# Patient Record
Sex: FEMALE | Race: WHITE | NOT HISPANIC OR LATINO | Employment: UNEMPLOYED | URBAN - METROPOLITAN AREA
[De-identification: names, ages, dates, MRNs, and addresses within clinical notes are randomized per-mention and may not be internally consistent; named-entity substitution may affect disease eponyms.]

---

## 2018-01-23 ENCOUNTER — RECORDS - HEALTHEAST (OUTPATIENT)
Dept: LAB | Facility: CLINIC | Age: 33
End: 2018-01-23

## 2018-01-24 LAB
ALLERGIC TO PENICILLIN: NO
GP B STREP DNA SPEC QL NAA+PROBE: NEGATIVE

## 2018-02-12 ENCOUNTER — ANESTHESIA - HEALTHEAST (OUTPATIENT)
Dept: OBGYN | Facility: HOSPITAL | Age: 33
End: 2018-02-12

## 2018-02-12 ENCOUNTER — RECORDS - HEALTHEAST (OUTPATIENT)
Dept: ADMINISTRATIVE | Facility: OTHER | Age: 33
End: 2018-02-12

## 2018-02-14 ENCOUNTER — COMMUNICATION - HEALTHEAST (OUTPATIENT)
Dept: OBGYN | Facility: HOSPITAL | Age: 33
End: 2018-02-14

## 2018-02-15 ENCOUNTER — COMMUNICATION - HEALTHEAST (OUTPATIENT)
Dept: OBGYN | Facility: HOSPITAL | Age: 33
End: 2018-02-15

## 2018-03-27 ENCOUNTER — COMMUNICATION - HEALTHEAST (OUTPATIENT)
Dept: OBGYN | Facility: HOSPITAL | Age: 33
End: 2018-03-27

## 2018-03-30 ENCOUNTER — COMMUNICATION - HEALTHEAST (OUTPATIENT)
Dept: OBGYN | Facility: HOSPITAL | Age: 33
End: 2018-03-30

## 2018-04-27 ENCOUNTER — RECORDS - HEALTHEAST (OUTPATIENT)
Dept: LAB | Facility: CLINIC | Age: 33
End: 2018-04-27

## 2018-04-27 LAB
HIV 1+2 AB+HIV1 P24 AG SERPL QL IA: NEGATIVE
T4 FREE SERPL-MCNC: 1 NG/DL (ref 0.7–1.8)
TSH SERPL DL<=0.005 MIU/L-ACNC: 0.62 UIU/ML (ref 0.3–5)

## 2018-04-28 LAB — T PALLIDUM AB SER QL: NEGATIVE

## 2018-04-30 LAB
C TRACH DNA SPEC QL PROBE+SIG AMP: NEGATIVE
HPV SOURCE: ABNORMAL
HUMAN PAPILLOMA VIRUS 16 DNA: NEGATIVE
HUMAN PAPILLOMA VIRUS 18 DNA: NEGATIVE
HUMAN PAPILLOMA VIRUS FINAL DIAGNOSIS: ABNORMAL
HUMAN PAPILLOMA VIRUS OTHER HR: POSITIVE
N GONORRHOEA DNA SPEC QL NAA+PROBE: NEGATIVE
SPECIMEN DESCRIPTION: ABNORMAL

## 2018-05-03 LAB
BKR LAB AP ABNORMAL BLEEDING: NO
BKR LAB AP BIRTH CONTROL/HORMONES: ABNORMAL
BKR LAB AP CERVICAL APPEARANCE: NORMAL
BKR LAB AP GYN ADEQUACY: ABNORMAL
BKR LAB AP GYN INTERPRETATION: ABNORMAL
BKR LAB AP HPV REFLEX: ABNORMAL
BKR LAB AP LMP: ABNORMAL
BKR LAB AP PATIENT STATUS: ABNORMAL
BKR LAB AP PREVIOUS ABNORMAL: ABNORMAL
BKR LAB AP PREVIOUS NORMAL: ABNORMAL
HIGH RISK?: NO
PATH REPORT.COMMENTS IMP SPEC: ABNORMAL
RESULT FLAG (HE HISTORICAL CONVERSION): ABNORMAL

## 2018-05-17 ENCOUNTER — RECORDS - HEALTHEAST (OUTPATIENT)
Dept: LAB | Facility: CLINIC | Age: 33
End: 2018-05-17

## 2018-05-19 LAB — BACTERIA SPEC CULT: NORMAL

## 2018-05-25 ENCOUNTER — RECORDS - HEALTHEAST (OUTPATIENT)
Dept: ADMINISTRATIVE | Facility: OTHER | Age: 33
End: 2018-05-25

## 2018-05-25 LAB
LAB AP CHARGES (HE HISTORICAL CONVERSION): NORMAL
PATH REPORT.COMMENTS IMP SPEC: NORMAL
PATH REPORT.COMMENTS IMP SPEC: NORMAL
PATH REPORT.FINAL DX SPEC: NORMAL
PATH REPORT.GROSS SPEC: NORMAL
PATH REPORT.MICROSCOPIC SPEC OTHER STN: NORMAL
PATH REPORT.RELEVANT HX SPEC: NORMAL
RESULT FLAG (HE HISTORICAL CONVERSION): NORMAL

## 2018-06-25 ENCOUNTER — RECORDS - HEALTHEAST (OUTPATIENT)
Dept: ADMINISTRATIVE | Facility: OTHER | Age: 33
End: 2018-06-25

## 2018-07-06 ASSESSMENT — MIFFLIN-ST. JEOR: SCORE: 1211.18

## 2018-07-09 ENCOUNTER — ANESTHESIA - HEALTHEAST (OUTPATIENT)
Dept: SURGERY | Facility: HOSPITAL | Age: 33
End: 2018-07-09

## 2018-07-09 ENCOUNTER — SURGERY - HEALTHEAST (OUTPATIENT)
Dept: SURGERY | Facility: HOSPITAL | Age: 33
End: 2018-07-09

## 2018-10-19 ENCOUNTER — RECORDS - HEALTHEAST (OUTPATIENT)
Dept: LAB | Facility: CLINIC | Age: 33
End: 2018-10-19

## 2018-10-19 LAB
ANION GAP SERPL CALCULATED.3IONS-SCNC: 9 MMOL/L (ref 5–18)
BUN SERPL-MCNC: 15 MG/DL (ref 8–22)
CALCIUM SERPL-MCNC: 9.3 MG/DL (ref 8.5–10.5)
CHLORIDE BLD-SCNC: 105 MMOL/L (ref 98–107)
CO2 SERPL-SCNC: 25 MMOL/L (ref 22–31)
CREAT SERPL-MCNC: 0.67 MG/DL (ref 0.6–1.1)
GFR SERPL CREATININE-BSD FRML MDRD: >60 ML/MIN/1.73M2
GLUCOSE BLD-MCNC: 125 MG/DL (ref 70–125)
MAGNESIUM SERPL-MCNC: 2.3 MG/DL (ref 1.8–2.6)
POTASSIUM BLD-SCNC: 4 MMOL/L (ref 3.5–5)
SODIUM SERPL-SCNC: 139 MMOL/L (ref 136–145)
TSH SERPL DL<=0.005 MIU/L-ACNC: 0.97 UIU/ML (ref 0.3–5)

## 2019-09-19 ENCOUNTER — RECORDS - HEALTHEAST (OUTPATIENT)
Dept: LAB | Facility: CLINIC | Age: 34
End: 2019-09-19

## 2019-09-19 LAB
ABORH_EXT (HISTORICAL CONVERSION): NORMAL
ANTIBODY_EXT (HISTORICAL CONVERSION): NEGATIVE
BASOPHILS # BLD AUTO: 0 THOU/UL (ref 0–0.2)
BASOPHILS NFR BLD AUTO: 0 % (ref 0–2)
EOSINOPHIL # BLD AUTO: 0.2 THOU/UL (ref 0–0.4)
EOSINOPHIL NFR BLD AUTO: 2 % (ref 0–6)
ERYTHROCYTE [DISTWIDTH] IN BLOOD BY AUTOMATED COUNT: 12.7 % (ref 11–14.5)
HBSAG_EXT (HISTORICAL CONVERSION): NEGATIVE
HCT VFR BLD AUTO: 40.4 % (ref 35–47)
HGB BLD-MCNC: 13.4 G/DL (ref 12–16)
HGB_EXT (HISTORICAL CONVERSION): 13.4
HIV 1+2 AB+HIV1 P24 AG SERPL QL IA: NEGATIVE
HIV_EXT: NEGATIVE
LYMPHOCYTES # BLD AUTO: 1.8 THOU/UL (ref 0.8–4.4)
LYMPHOCYTES NFR BLD AUTO: 22 % (ref 20–40)
MCH RBC QN AUTO: 31.6 PG (ref 27–34)
MCHC RBC AUTO-ENTMCNC: 33.2 G/DL (ref 32–36)
MCV RBC AUTO: 95 FL (ref 80–100)
MONOCYTES # BLD AUTO: 0.4 THOU/UL (ref 0–0.9)
MONOCYTES NFR BLD AUTO: 5 % (ref 2–10)
NEUTROPHILS # BLD AUTO: 5.6 THOU/UL (ref 2–7.7)
NEUTROPHILS NFR BLD AUTO: 70 % (ref 50–70)
PLATELET # BLD AUTO: 258 THOU/UL (ref 140–440)
PLT_EXT - HISTORICAL: 258
PMV BLD AUTO: 10 FL (ref 8.5–12.5)
RBC # BLD AUTO: 4.24 MILL/UL (ref 3.8–5.4)
RUBELLA_EXT (HISTORICAL CONVERSION): NORMAL
WBC: 8 THOU/UL (ref 4–11)

## 2019-09-20 LAB
ABO/RH(D): NORMAL
ABORH REPEAT: NORMAL
ANTIBODY SCREEN: NEGATIVE
BACTERIA SPEC CULT: NO GROWTH
C TRACH DNA SPEC QL PROBE+SIG AMP: NEGATIVE
HBV SURFACE AG SERPL QL IA: NEGATIVE
N GONORRHOEA DNA SPEC QL NAA+PROBE: NEGATIVE
RUBV IGG SERPL QL IA: POSITIVE
T PALLIDUM AB SER QL: NEGATIVE

## 2019-10-14 LAB
HPV SOURCE: NORMAL
HUMAN PAPILLOMA VIRUS 16 DNA: NEGATIVE
HUMAN PAPILLOMA VIRUS 18 DNA: NEGATIVE
HUMAN PAPILLOMA VIRUS FINAL DIAGNOSIS: NORMAL
HUMAN PAPILLOMA VIRUS OTHER HR: NEGATIVE
SPECIMEN DESCRIPTION: NORMAL

## 2019-10-18 ENCOUNTER — RECORDS - HEALTHEAST (OUTPATIENT)
Dept: ADMINISTRATIVE | Facility: OTHER | Age: 34
End: 2019-10-18

## 2019-11-22 ENCOUNTER — RECORDS - HEALTHEAST (OUTPATIENT)
Dept: LAB | Facility: CLINIC | Age: 34
End: 2019-11-22

## 2019-11-26 LAB
# FETUSES US: NORMAL
AFP MOM SERPL: 1.04
AFP SERPL-MCNC: 50 NG/ML
AGE - REPORTED: 33.7 YR
CURRENT SMOKER: NO
FAMILY MEMBER DISEASES HX: NO
GA METHOD: NORMAL
GA: NORMAL WK
HCG MOM SERPL: 0.44
HCG SERPL-ACNC: NORMAL IU/L
HX OF HEREDITARY DISORDERS: NO
IDDM PATIENT QL: NO
INHIBIN A MOM SERPL: 2.46
INHIBIN A SERPL-MCNC: 413 PG/ML
INTEGRATED SCN PATIENT-IMP: NORMAL
PATHOLOGY STUDY: NORMAL
SPECIMEN DRAWN SERPL: NORMAL
U ESTRIOL MOM SERPL: 0.85
U ESTRIOL SERPL-MCNC: 1.48 NG/ML

## 2019-12-16 ENCOUNTER — AMBULATORY - HEALTHEAST (OUTPATIENT)
Dept: MATERNAL FETAL MEDICINE | Facility: HOSPITAL | Age: 34
End: 2019-12-16

## 2019-12-16 DIAGNOSIS — O26.90 PREGNANCY, ANTEPARTUM, COMPLICATIONS: ICD-10-CM

## 2019-12-23 ENCOUNTER — AMBULATORY - HEALTHEAST (OUTPATIENT)
Dept: MATERNAL FETAL MEDICINE | Facility: HOSPITAL | Age: 34
End: 2019-12-23

## 2019-12-27 ENCOUNTER — RECORDS - HEALTHEAST (OUTPATIENT)
Dept: ULTRASOUND IMAGING | Facility: HOSPITAL | Age: 34
End: 2019-12-27

## 2019-12-27 ENCOUNTER — OFFICE VISIT - HEALTHEAST (OUTPATIENT)
Dept: MATERNAL FETAL MEDICINE | Facility: HOSPITAL | Age: 34
End: 2019-12-27

## 2019-12-27 DIAGNOSIS — O26.90 PREGNANCY RELATED CONDITIONS, UNSPECIFIED, UNSPECIFIED TRIMESTER: ICD-10-CM

## 2019-12-27 DIAGNOSIS — O35.BXX0 ECHOGENIC FOCUS OF HEART OF FETUS AFFECTING ANTEPARTUM CARE OF MOTHER, SINGLE OR UNSPECIFIED FETUS: ICD-10-CM

## 2020-01-20 ENCOUNTER — RECORDS - HEALTHEAST (OUTPATIENT)
Dept: LAB | Facility: CLINIC | Age: 35
End: 2020-01-20

## 2020-01-20 LAB — HGB_EXT (HISTORICAL CONVERSION): 11.7

## 2020-01-21 LAB
RPR - HISTORICAL: NORMAL
T PALLIDUM AB SER QL: NEGATIVE

## 2020-02-18 ENCOUNTER — RECORDS - HEALTHEAST (OUTPATIENT)
Dept: ADMINISTRATIVE | Facility: OTHER | Age: 35
End: 2020-02-18

## 2020-03-19 ENCOUNTER — RECORDS - HEALTHEAST (OUTPATIENT)
Dept: LAB | Facility: CLINIC | Age: 35
End: 2020-03-19

## 2020-03-20 LAB
ALLERGIC TO PENICILLIN: NORMAL
GP B STREP DNA SPEC QL NAA+PROBE: NEGATIVE

## 2020-04-20 ENCOUNTER — HOSPITAL ENCOUNTER (OUTPATIENT)
Dept: OBGYN | Facility: HOSPITAL | Age: 35
Discharge: HOME | End: 2020-04-20
Attending: FAMILY MEDICINE | Admitting: FAMILY MEDICINE

## 2020-04-20 RX ORDER — CETIRIZINE HYDROCHLORIDE 10 MG/1
10 TABLET ORAL DAILY
Status: SHIPPED | COMMUNITY
Start: 2020-04-20

## 2020-04-20 RX ORDER — ACYCLOVIR 400 MG/1
400 TABLET ORAL DAILY
Status: SHIPPED | COMMUNITY
Start: 2020-04-20

## 2020-04-20 ASSESSMENT — MIFFLIN-ST. JEOR: SCORE: 1324.58

## 2020-04-23 LAB — T PALLIDUM AB SER QL: NEGATIVE

## 2021-03-29 ENCOUNTER — RECORDS - HEALTHEAST (OUTPATIENT)
Dept: LAB | Facility: CLINIC | Age: 36
End: 2021-03-29

## 2021-03-29 LAB — HGB BLD-MCNC: 13.4 G/DL (ref 12–16)

## 2021-06-01 VITALS — BODY MASS INDEX: 25.52 KG/M2 | HEIGHT: 60 IN | WEIGHT: 130 LBS

## 2021-06-04 VITALS — WEIGHT: 155 LBS | HEIGHT: 60 IN | BODY MASS INDEX: 30.43 KG/M2

## 2021-06-04 NOTE — PROGRESS NOTES
"Please see \"Imaging\" tab under \"Chart Review\" for details of today's visit.    Ion Quiñones        "

## 2021-06-07 NOTE — PROGRESS NOTES
RNs at bedside during shift change. Pt questioned how long induction would take. RNs informed pt it is possible it could take a few days (pt had been informed prior to starting induction per RN that started induction.) Pt became tearful saying that she cannot be away from her baby (at home) for that long. Pt reported that she wanted her cervix examined and if there was no change she was going home.   This RN called and informed Dr. Adrienne Youngblood. Last Cytotec was given at 1430. Pt werner q3-4 min. FHR cat 1 tracing. VSS.     SVE at 1550. 1/50/-2. Pt reported she wanted to be discharged immediately. RN encouraged pt to stay to monitor baby until 1730 per Dr HALINA Youngblood. Pt agreeable. Dr EDUARDO Youngblood notified.

## 2021-06-07 NOTE — PROGRESS NOTES
RN called into room.  Pt stated that she is feeling contractions about every 10 minutes and is undecided on if she would like to go home or stay to receive further cervical ripening. Latest SVE unchanged.  Dr. Youngblood notified again of pt's indecisiveness. Dr. Youngblood approves discharge after 1730 after continuous monitoring of FHTs.  Discussed the options with patient for cervical ripening overnight vs. going home and waiting for natural labor.  Pt is anxious to be home with her daughter and requests to be discharged as soon as possible.

## 2021-06-07 NOTE — PROGRESS NOTES
Patient here for induction. Unable to start induction at this time due to staffing, patient refuses to reschedule induction to tomorrow. Admission information obtained. Admission fetal monitor strip obtained. Patient tired as she did not sleep well, so plans on taking a nap until a nurse is available to start her induction.

## 2021-06-07 NOTE — PLAN OF CARE
Problem: Safety  Goal: Patient will be injury free during hospitalization  Outcome: Progressing     Problem: Daily Care  Goal: Daily care needs are met  Outcome: Progressing     Problem: Labor & Delivery  Goal: Manages discomfort  Outcome: Progressing  Pt here for elective induction of labor. Cytotec induction started at 1220.

## 2021-06-16 PROBLEM — Z34.90 PREGNANT: Status: ACTIVE | Noted: 2018-02-12

## 2021-06-16 PROBLEM — Z34.90 PREGNANT: Status: ACTIVE | Noted: 2020-04-20

## 2021-06-16 PROBLEM — A60.00 GENITAL HERPES: Chronic | Status: ACTIVE | Noted: 2018-02-12

## 2021-06-16 NOTE — ANESTHESIA POSTPROCEDURE EVALUATION
Patient: Ines Conrad  * No procedures listed *  Anesthesia type: epidural    Patient location:   Last vitals:   Vitals:    02/13/18 0258   BP: 119/57   Pulse: 69   Resp: 16   Temp: 36.4  C (97.5  F)   SpO2:    Pt satisfied with LE, has ambulated.  Post vital signs: stable  Level of consciousness: baseline  Post-anesthesia pain: pain controlled  Post-anesthesia nausea and vomiting: no  Pulmonary: unassisted, return to baseline  Cardiovascular: stable and blood pressure at baseline  Hydration: adequate  Anesthetic events: no    QCDR Measures:  ASA# 11 - Melody-op Cardiac Arrest: ASA11B - Patient did NOT experience unanticipated cardiac arrest  ASA# 12 - Melody-op Mortality Rate: ASA12B - Patient did NOT die  ASA# 13 - PACU Re-Intubation Rate: ASA13B - Patient did NOT require a new airway mgmt  ASA# 10 - Composite Anes Safety: ASA10A - No serious adverse event    Additional Notes:

## 2021-06-16 NOTE — ANESTHESIA PREPROCEDURE EVALUATION
Anesthesia Evaluation      Patient summary reviewed   No history of anesthetic complications     Airway   Mallampati: II  Neck ROM: full   Pulmonary - negative ROS and normal exam                          Cardiovascular - negative ROS and normal exam  Exercise tolerance: > or = 4 METS  Rhythm: regular  Rate: normal,         Neuro/Psych - negative ROS     Endo/Other    (+) pregnant     GI/Hepatic/Renal - negative ROS           Dental - normal exam                        Anesthesia Plan  Planned anesthetic: epidural    ASA 2     Anesthetic plan and risks discussed with: patient    Post-op plan: routine recovery

## 2021-06-16 NOTE — ANESTHESIA PROCEDURE NOTES
Epidural Block    Patient location during procedure: OB  Time Called: 2/12/2018 3:19 AM  Reason for Block:labor epidural  Staffing:  Performing  Anesthesiologist: KHURRAM CRUZ  Preanesthetic Checklist  Completed: patient identified, risks, benefits, and alternatives discussed, timeout performed, consent obtained, at patient's request, airway assessed, oxygen available, suction available, emergency drugs available and hand hygiene performed  Procedure  Patient position: sitting  Prep: ChloraPrep  Patient monitoring: continuous pulse oximetry, heart rate and blood pressure  Approach: midline  Location: L4-L5  Injection technique: CHERI saline  Number of Attempts:1  Needle  Needle type: Reuben   Needle gauge: 18 G     Catheter in Space: 5  Assessment  Sensory level: T10  No complications      Additional Notes:  saskia well

## 2021-06-19 NOTE — ANESTHESIA POSTPROCEDURE EVALUATION
Patient: Ines AGUILERA Conrad  LAPAROSCOPY LEFT OVARIAN CYSTECTOMY,  AND REMOVAL OF MOLE ( RIGHT OF UMBILICUS  Anesthesia type: general    Patient location: PACU  Last vitals:   Vitals:    07/09/18 1545   BP:    Pulse: 76   Resp: 20   Temp: 36.3  C (97.4  F)   SpO2: 99%     Post vital signs: stable  Level of consciousness: awake and responds to simple questions  Post-anesthesia pain: pain controlled  Post-anesthesia nausea and vomiting: no  Pulmonary: unassisted, return to baseline  Cardiovascular: stable and blood pressure at baseline  Hydration: adequate  Anesthetic events: no    QCDR Measures:  ASA# 11 - Melody-op Cardiac Arrest: ASA11B - Patient did NOT experience unanticipated cardiac arrest  ASA# 12 - Melody-op Mortality Rate: ASA12B - Patient did NOT die  ASA# 13 - PACU Re-Intubation Rate: ASA13B - Patient did NOT require a new airway mgmt  ASA# 10 - Composite Anes Safety: ASA10A - No serious adverse event    Additional Notes:

## 2021-06-19 NOTE — ANESTHESIA CARE TRANSFER NOTE
Last vitals:   Vitals:    07/09/18 1521   BP: 131/72   Pulse: (!) 105   Resp: 17   Temp:    SpO2: 100%   Temp: 98.2f temporal    Volatile agents turned off, muscle relaxant reversed, 4/4 twitches with sustained tetany. Pt breathing spontaneously with adequate tidal volumes, following commands, gently suctioned oropharynx, extubated without issue. Transported by CRNA and RN to recovery.      Patient's level of consciousness is awake and drowsy  Spontaneous respirations: yes  Maintains airway independently: yes  Dentition unchanged: yes  Oropharynx: oropharynx clear of all foreign objects    QCDR Measures:  ASA# 20 - Surgical Safety Checklist: WHO surgical safety checklist completed prior to induction  PQRS# 430 - Adult PONV Prevention: 4558F - Pt received => 2 anti-emetic agents (different classes) preop & intraop  ASA# 8 - Peds PONV Prevention: NA - Not pediatric patient, not GA or 2 or more risk factors NOT present  PQRS# 424 - Melody-op Temp Management: 4559F - At least one body temp DOCUMENTED => 35.5C or 95.9F within required timeframe  PQRS# 426 - PACU Transfer Protocol: - Transfer of care checklist used  ASA# 14 - Acute Post-op Pain: ASA14B - Patient did NOT experience pain >= 7 out of 10

## 2022-08-04 NOTE — ANESTHESIA PREPROCEDURE EVALUATION
Anesthesia Evaluation      Patient summary reviewed   No history of anesthetic complications     Airway   Mallampati: II   Pulmonary - normal exam   (+) a smoker (1ppd. smoker's cough)                         Cardiovascular - negative ROS  Exercise tolerance: > or = 4 METS  Rhythm: regular  Rate: normal,         Neuro/Psych - negative ROS     Endo/Other - negative ROS      GI/Hepatic/Renal    (+) GERD well controlled,        Other findings: UPT  Hgb=15.0        Dental - normal exam                        Anesthesia Plan  Planned anesthetic: general endotracheal  Soft bite block  Scopolamine patch  Zofran/decadron  Propofol background infusion  ASA 2   Induction: intravenous   Anesthetic plan and risks discussed with: patient    Post-op plan: routine recovery           no

## 2023-07-21 ENCOUNTER — LAB REQUISITION (OUTPATIENT)
Dept: LAB | Facility: CLINIC | Age: 38
End: 2023-07-21

## 2023-07-21 DIAGNOSIS — R20.0 ANESTHESIA OF SKIN: ICD-10-CM

## 2023-07-21 PROCEDURE — 80053 COMPREHEN METABOLIC PANEL: CPT | Performed by: FAMILY MEDICINE

## 2023-07-21 PROCEDURE — 82607 VITAMIN B-12: CPT | Performed by: FAMILY MEDICINE

## 2023-07-21 PROCEDURE — 84443 ASSAY THYROID STIM HORMONE: CPT | Performed by: FAMILY MEDICINE

## 2023-07-22 LAB
ALBUMIN SERPL BCG-MCNC: 4.4 G/DL (ref 3.5–5.2)
ALP SERPL-CCNC: 69 U/L (ref 35–104)
ALT SERPL W P-5'-P-CCNC: 11 U/L (ref 0–50)
ANION GAP SERPL CALCULATED.3IONS-SCNC: 12 MMOL/L (ref 7–15)
AST SERPL W P-5'-P-CCNC: 18 U/L (ref 0–45)
BILIRUB SERPL-MCNC: <0.2 MG/DL
BUN SERPL-MCNC: 9 MG/DL (ref 6–20)
CALCIUM SERPL-MCNC: 9.2 MG/DL (ref 8.6–10)
CHLORIDE SERPL-SCNC: 103 MMOL/L (ref 98–107)
CREAT SERPL-MCNC: 0.58 MG/DL (ref 0.51–0.95)
DEPRECATED HCO3 PLAS-SCNC: 23 MMOL/L (ref 22–29)
GFR SERPL CREATININE-BSD FRML MDRD: >90 ML/MIN/1.73M2
GLUCOSE SERPL-MCNC: 95 MG/DL (ref 70–99)
POTASSIUM SERPL-SCNC: 3.9 MMOL/L (ref 3.4–5.3)
PROT SERPL-MCNC: 6.2 G/DL (ref 6.4–8.3)
SODIUM SERPL-SCNC: 138 MMOL/L (ref 136–145)
TSH SERPL DL<=0.005 MIU/L-ACNC: 1.34 UIU/ML (ref 0.3–4.2)
VIT B12 SERPL-MCNC: 428 PG/ML (ref 232–1245)

## 2023-07-24 ENCOUNTER — TRANSCRIBE ORDERS (OUTPATIENT)
Dept: OTHER | Age: 38
End: 2023-07-24

## 2023-07-24 DIAGNOSIS — R13.10 DYSPHAGIA: Primary | ICD-10-CM

## 2023-09-16 ENCOUNTER — HEALTH MAINTENANCE LETTER (OUTPATIENT)
Age: 38
End: 2023-09-16

## 2023-09-25 ENCOUNTER — TELEPHONE (OUTPATIENT)
Dept: NEUROLOGY | Facility: CLINIC | Age: 38
End: 2023-09-25
Payer: COMMERCIAL

## 2023-09-26 ENCOUNTER — OFFICE VISIT (OUTPATIENT)
Dept: NEUROLOGY | Facility: CLINIC | Age: 38
End: 2023-09-26
Payer: COMMERCIAL

## 2023-09-26 VITALS
DIASTOLIC BLOOD PRESSURE: 57 MMHG | HEART RATE: 78 BPM | SYSTOLIC BLOOD PRESSURE: 98 MMHG | OXYGEN SATURATION: 98 % | HEIGHT: 60 IN | BODY MASS INDEX: 21.6 KG/M2 | WEIGHT: 110 LBS

## 2023-09-26 DIAGNOSIS — R26.89 IMBALANCE: ICD-10-CM

## 2023-09-26 DIAGNOSIS — M54.2 NECK PAIN: Primary | ICD-10-CM

## 2023-09-26 DIAGNOSIS — R20.2 NUMBNESS AND TINGLING IN BOTH HANDS: ICD-10-CM

## 2023-09-26 DIAGNOSIS — R20.0 NUMBNESS AND TINGLING IN BOTH HANDS: ICD-10-CM

## 2023-09-26 DIAGNOSIS — R35.0 FREQUENT URINATION: ICD-10-CM

## 2023-09-26 DIAGNOSIS — R13.19 OTHER DYSPHAGIA: ICD-10-CM

## 2023-09-26 DIAGNOSIS — H53.8 BLURRED VISION: ICD-10-CM

## 2023-09-26 PROCEDURE — 99204 OFFICE O/P NEW MOD 45 MIN: CPT | Mod: GC

## 2023-09-26 RX ORDER — PRAMIPEXOLE DIHYDROCHLORIDE 0.5 MG/1
TABLET ORAL
COMMUNITY
Start: 2023-05-30

## 2023-09-26 NOTE — PATIENT INSTRUCTIONS
- It is unlikely you have MS based on your symptoms, but to be certain we will order brain and neck MRI images to make sure   - The best way to prevent MS is to supplement with vitamin D - this can be purchased over the counter
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
## 2023-09-26 NOTE — NURSING NOTE
Suresh Conrad is a 38 year old female who presents for:  Chief Complaint   Patient presents with    Dysphagia     Dysphagia        Initial Vitals:  BP 98/57   Pulse 78   Ht 1.524 m (5')   Wt 49.9 kg (110 lb)   SpO2 98%   BMI 21.48 kg/m   Estimated body mass index is 21.48 kg/m  as calculated from the following:    Height as of this encounter: 1.524 m (5').    Weight as of this encounter: 49.9 kg (110 lb).. Body surface area is 1.45 meters squared. BP completed using cuff size: small regular    Rebecca Rathai

## 2023-10-30 ENCOUNTER — HOSPITAL ENCOUNTER (OUTPATIENT)
Dept: MRI IMAGING | Facility: HOSPITAL | Age: 38
Discharge: HOME | End: 2023-10-30
Attending: STUDENT IN AN ORGANIZED HEALTH CARE EDUCATION/TRAINING PROGRAM
Payer: COMMERCIAL

## 2023-10-30 DIAGNOSIS — M54.2 NECK PAIN: ICD-10-CM

## 2023-10-30 DIAGNOSIS — R13.19 OTHER DYSPHAGIA: ICD-10-CM

## 2023-10-30 DIAGNOSIS — R20.0 NUMBNESS AND TINGLING IN BOTH HANDS: ICD-10-CM

## 2023-10-30 DIAGNOSIS — H53.8 BLURRED VISION: ICD-10-CM

## 2023-10-30 DIAGNOSIS — R20.2 NUMBNESS AND TINGLING IN BOTH HANDS: ICD-10-CM

## 2023-10-30 DIAGNOSIS — R26.89 IMBALANCE: ICD-10-CM

## 2023-10-30 PROCEDURE — 70553 MRI BRAIN STEM W/O & W/DYE: CPT

## 2023-10-30 PROCEDURE — 72156 MRI NECK SPINE W/O & W/DYE: CPT

## 2023-10-30 PROCEDURE — A9585 GADOBUTROL INJECTION: HCPCS | Mod: JZ | Performed by: STUDENT IN AN ORGANIZED HEALTH CARE EDUCATION/TRAINING PROGRAM

## 2023-10-30 PROCEDURE — 255N000002 HC RX 255 OP 636: Mod: JZ | Performed by: STUDENT IN AN ORGANIZED HEALTH CARE EDUCATION/TRAINING PROGRAM

## 2023-10-30 RX ORDER — GADOBUTROL 604.72 MG/ML
0.1 INJECTION INTRAVENOUS ONCE
Status: COMPLETED | OUTPATIENT
Start: 2023-10-30 | End: 2023-10-30

## 2023-10-30 RX ADMIN — GADOBUTROL 5 ML: 604.72 INJECTION INTRAVENOUS at 16:49

## 2023-11-27 ENCOUNTER — LAB REQUISITION (OUTPATIENT)
Dept: LAB | Facility: CLINIC | Age: 38
End: 2023-11-27

## 2023-11-27 DIAGNOSIS — M79.641 PAIN IN RIGHT HAND: ICD-10-CM

## 2023-11-27 LAB
ALBUMIN SERPL BCG-MCNC: 4.3 G/DL (ref 3.5–5.2)
ALP SERPL-CCNC: 70 U/L (ref 40–150)
ALT SERPL W P-5'-P-CCNC: 8 U/L (ref 0–50)
ANION GAP SERPL CALCULATED.3IONS-SCNC: 12 MMOL/L (ref 7–15)
AST SERPL W P-5'-P-CCNC: 14 U/L (ref 0–45)
BILIRUB SERPL-MCNC: <0.2 MG/DL
BUN SERPL-MCNC: 15.4 MG/DL (ref 6–20)
CALCIUM SERPL-MCNC: 9.3 MG/DL (ref 8.6–10)
CHLORIDE SERPL-SCNC: 105 MMOL/L (ref 98–107)
CREAT SERPL-MCNC: 0.71 MG/DL (ref 0.51–0.95)
CRP SERPL-MCNC: <3 MG/L
DEPRECATED HCO3 PLAS-SCNC: 21 MMOL/L (ref 22–29)
EGFRCR SERPLBLD CKD-EPI 2021: >90 ML/MIN/1.73M2
ERYTHROCYTE [SEDIMENTATION RATE] IN BLOOD BY WESTERGREN METHOD: 3 MM/HR (ref 0–20)
GLUCOSE SERPL-MCNC: 109 MG/DL (ref 70–99)
POTASSIUM SERPL-SCNC: 4.8 MMOL/L (ref 3.4–5.3)
PROT SERPL-MCNC: 6.5 G/DL (ref 6.4–8.3)
RHEUMATOID FACT SERPL-ACNC: <10 IU/ML
SODIUM SERPL-SCNC: 138 MMOL/L (ref 135–145)

## 2023-11-27 PROCEDURE — 86038 ANTINUCLEAR ANTIBODIES: CPT | Performed by: FAMILY MEDICINE

## 2023-11-27 PROCEDURE — 86431 RHEUMATOID FACTOR QUANT: CPT | Performed by: FAMILY MEDICINE

## 2023-11-27 PROCEDURE — 85652 RBC SED RATE AUTOMATED: CPT | Performed by: FAMILY MEDICINE

## 2023-11-27 PROCEDURE — 80053 COMPREHEN METABOLIC PANEL: CPT | Performed by: FAMILY MEDICINE

## 2023-11-27 PROCEDURE — 86140 C-REACTIVE PROTEIN: CPT | Performed by: FAMILY MEDICINE

## 2023-11-28 LAB — ANA SER QL IF: NEGATIVE

## 2024-05-14 ENCOUNTER — TRANSFERRED RECORDS (OUTPATIENT)
Dept: HEALTH INFORMATION MANAGEMENT | Facility: CLINIC | Age: 39
End: 2024-05-14
Payer: COMMERCIAL

## 2024-05-15 ENCOUNTER — MEDICAL CORRESPONDENCE (OUTPATIENT)
Dept: HEALTH INFORMATION MANAGEMENT | Facility: CLINIC | Age: 39
End: 2024-05-15
Payer: COMMERCIAL

## 2024-05-16 ENCOUNTER — TRANSCRIBE ORDERS (OUTPATIENT)
Dept: OTHER | Age: 39
End: 2024-05-16

## 2024-05-16 DIAGNOSIS — R29.898 HAND WEAKNESS: Primary | ICD-10-CM

## 2024-10-04 ENCOUNTER — TELEPHONE (OUTPATIENT)
Dept: CONSULT | Facility: CLINIC | Age: 39
End: 2024-10-04
Payer: COMMERCIAL

## 2024-10-04 NOTE — TELEPHONE ENCOUNTER
I received a voicemail from Suresh inquiring about genetic testing for the familial CMT. I have seen several of her relatives in the past.    I spoke with Suresh and we arranged for a telephone genetic counseling appointment next Wednesday 10/9 at 2:00pm.     Jolene Ronquillo MS, [facility]  Licensed Genetic Counselor  [facility]  Phone: [phone]  Fax: [phone]

## 2024-10-09 ENCOUNTER — VIRTUAL VISIT (OUTPATIENT)
Dept: CONSULT | Facility: CLINIC | Age: 39
End: 2024-10-09
Attending: GENETIC COUNSELOR, MS
Payer: COMMERCIAL

## 2024-10-09 DIAGNOSIS — Z84.89 FAMILY HISTORY OF GENETIC DISEASE: Primary | ICD-10-CM

## 2024-10-09 DIAGNOSIS — R20.0 NUMBNESS AND TINGLING IN BOTH HANDS: ICD-10-CM

## 2024-10-09 DIAGNOSIS — R20.2 NUMBNESS AND TINGLING IN BOTH HANDS: ICD-10-CM

## 2024-10-09 PROCEDURE — 999N000069 HC STATISTIC GENETIC COUNSELING, < 16 MIN: Mod: TEL,95 | Performed by: GENETIC COUNSELOR, MS

## 2024-10-09 NOTE — PROGRESS NOTES
GENETIC COUNSELING CONSULTATION NOTE    Date of visit: Oct 9, 2024    Presenting Information:   Suresh Conrad is a 39 year old female referred to the [facility] due to a family history of CMT1X. She was seen for a genetic counseling appointment today to discuss genetic testing for the known familial variant. Today's visit was conducted via telephone     Suresh reports that over the past year she has had worsening numbness in her hands and arms and that this increases with colder weather. She reports that previously she thought she may have multiple sclerosis.     Upon chart review, it looks like Suresh was seen by a Neurologist (Dr. VANCE Johnson, resident, and Dr. Cutler attending) at [facility] in September 2023 for evaluation of her history of restless leg syndrome, 5 years of dysphagia, intermittent bilateral upper and lower extremity numbness, weakness, blurred vision, urinary frequency and cognitive changes. They did not feel her symptoms were consistent with multiple sclerosis. In the clinic note from this visit it is documented that there is a history of MS in Suresh's mother and maternal aunt, but in the patient entered questionnaire it is documented that there is a family history of CMT, but this was not commented in the note so I fear this was misattributed as MS by both the patient and neurologist.      Suresh reports no other major health concerns today. Her sister in-law was the one who informed her about the positive family genetic testing for CMT1X, so Suresh would like testing for herself to determine if she has this familial condition.     Family History: A three generation pedigree was obtained at Suresh's family member's genetic counseling visit and was updated and scanned into the electronic medical record. The relevant portions are described below:    Children-   7 year old daughter who is healthy  4 year old son who is healthy  Siblings-   Brother, Mohan, is 46 years  old and has CMT with symptoms beginning when he was a toddler.   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   His 22 year old son is healthy  His 17 year old son is healthy.   Brother, Deborah, is 41 years old and has carpal tunnel.   He has one daughter and three sons who are healthy.   Brother, Paco, is 39 years old and is healthy.   He has two sons who are healthy.   Brother, Mehrdad, is 37 years old and has CMT symptoms.   His 3 year old daughter has poor coordination and balance and had a neuropathies panel which identified the GJB1 c.548G>A (p.[id number]) variant.   He has a 13 year old son who is healthy. He has three other children, but there is limited information about their health since there is no contact with them.   Brother, Ender, is 35 years old and has CMT symptoms.   He has one son who is healthy.   Parents-   Mother is reported to have CMT although her symptoms are milder than her sons'.  Father is alive and well.   Maternal Relatives-   At least 2 maternal aunts who are reported to have CMT. One aunt has a son who is severely affected with CMT.   No information about other maternal aunts/uncles.   Maternal grandmother is    Maternal grandfather is reported to have had CMT symptoms. He is .   Paternal Relatives- no paternal relative information collected today    Family history is otherwise largely non-contributory. Maternal and paternal ancestry is . Consanguinity was denied.     Genetic Counseling Discussion:  For review, our bodies are made of cells that contain our chromosomes which are made up of long stretches of DNA containing our genes. Our genes serve as the instructions for our bodies to grow and function. We have two copies of each gene, one inherited from our mother and one inherited from our father.     Amea family has CMT1X with a GJB1 pathogenic  variant called c.548G>A ([id number]) identified in two of her nieces (her brothers daughters).      GJB1 gene and CMT1X:  The GJB1 gene provides the instructions for building a protein called connexin 32. This protein plays an important role in the peripheral nervous system, although this role is not fully understood. Pathogenic variants (mutations) in the GJB1 gene cause Charcot-Tete-Tooth disease type 1X (CMT1X).      Charcot-Tete-Tooth (CMT) disease encompasses a group of disorders called hereditary sensory and motor neuropathies. CMT causes damage to the peripheral nerves that connect the brain and spinal cord to the muscles and sensory cells. The damage to the peripheral nerves causes muscle weakness and atrophy and loss of sensation in the feet, legs, and hands. Muscle weakness is symmetric and typically begins in the first to third decade of life and slowly progresses. Individuals with CMT may also have foot anomalies such as high arches, flat feet, or curled toes (hammer toes) and in rare cases they may develop hearing or vision loss. In most affected individuals CMT does not affect life expectancy. There are several different types of CMT (CMT1, CMT2, CMT4, CMTX) that were originally classified by their effects on nerve cells and patterns of inheritance and have now been further classified as the genetic cause of CMT subtypes have been uncovered.     CMT1X is the second most common type of CMT, accounting for 10% of all genetically defined CMT. CMT1X is a peripheral neuropathy that causes damage to the myelin in two types of the nerves, the motor nerves (involved in muscle movement) and the sensory nerves (involved in sensation or feeling). Damage to motor nerves causes muscle weakness and difficulty with muscle movement, especially in the hands and feet. This can lead to difficulty walking, writing, putting on jewelry and many other types of movement. Weakness in the small muscles of the foot can also  "lead to changes in the structure of the foot such as hammer toes or high/low arches. Damage to the sensory nerves can cause numbness, tingling and impaired balance, which can lead to fatigue.     The GJB1 gene is located on the X chromosome and therefore this condition is inherited in an X-linked inheritance pattern. Because males have one X chromosome and one Y chromosome, they will be affected if their X chromosome gene has a pathogenic variant. Because females have two X chromosomes, if one of their genes has a variant, they have a \"back-up\" second copy of the gene so they may be more mildly affected or unaffected. Female carriers of X-linked conditions have a 50% chance of passing their pathogenic variant to each child regardless of the child's sex. Males who are affected with X-linked conditions will pass their pathogenic variant to each of their female children and none of their male children.     CMT1X is highly variable within members of the same family. Individuals may not experience any symptoms, these symptoms may be mild, or they might begin as mild and gradually become more severe. Boys and men with CMTX generally develop the symptoms of CMTX between the ages of 5 and 20 and most develop symptoms prior to age 10. However, about 20% of men with this form of CMT have a later onset of symptoms. Unlike other type of CMT, individuals may experience weakness in their intrinsic hand muscles, especially their thenar (thumb) muscles, as their first symptom. Muscle weakness may be especially prominent in their thumbs and distal calf muscles.Additionally, there are many reports of stroke-like episodes that occur in men with CMTX prior to age 20. These episodes may involve inability to speak and/or muscle weakness on one side of the body which generally resolves without lasting damage but may reoccur. Almost all females with CMTX have atypical nerve conduction study results but some may never develop symptoms of " this condition. Many females with CMTX have more mild symptoms that what is seen in males. In some studies, as many as 1/3 of females with CMTX have a more severe presentation and their symptoms progress similarly to males with this condition. Genetic testing cannot predict how mild or severe an individual's symptoms will be.    Genetic Testing:  We discussed the potential benefits of genetic testing for the known familial GJB1 variant for Suresh today and why this genetic testing is medically indicated. A positive result will help determine the etiology of the numbness symptoms noted in Suresh and will guide the medical management for her. We discussed that if her results are positive for the familial variant, we can refer her to our specialist in our comprehensive CMT clinic (Neurologist, Physical Therapy, Genetic Counseling, and other specialists). Also, if Suresh does have the familial CMT1X, it will give us a more accurate risk assessment for her children who would then have a 50% chance of inheriting the condition.     This testing for Suresh will be performed via Next Generation Sequencing (NGS) and will only be analyzing the GJB1 gene. NGS is a well established technology utilized by all molecular genetic labs throughout the country for identifying disease-causing mutations in various genes.  NGS is currently the standard of care for genetic testing of single genes.  The recommended testing for Suresh is DIAGNOSTIC testing, and it is NOT investigational.    Suresh consented to genetic testing today. She will be mailed a buccal kit for sample collection. [facility] will bill Suresh's insurance directly and overall cost may depend on the remaining benefits, deductible, and co-insurances. Suresh will receive a bill from [facility] [facility] and if the bill is >$100 a representative from [facility] [facility] [facility] will reach out and discuss their financial assistance programs and payment plans. I will call Suresh with results of testing about  2-3 weeks after the lab receives her sample.     It was a pleasure talking with Suresh today. She was encouraged to reach out to me if she has any further questions.     Plan:  [facility] GJB1 Familial Variant Testing  Suresh will be mailed a buccal kit for sample collection. I will call her with results about 2-3 weeks after the lab receives her sample.       Jolene Ronquillo MS, [facility]  Licensed Genetic Counselor   [facility]  Phone: [phone]  Fax: [phone]    Time spent in consultation via telephone was approximately 10 minutes.

## 2024-10-09 NOTE — LETTER
10/9/2024      RE: Suresh Conrad  [address]  [place] [address]     Dear Colleague,    Thank you for the opportunity to participate in the care of your patient, Suresh Conrad, at the [facility] at [facility]. Please see a copy of my visit note below.    GENETIC COUNSELING CONSULTATION NOTE    Date of visit: Oct 9, 2024    Presenting Information:   Suresh Conrad is a 39 year old female referred to the [facility] due to a family history of CMT1X. She was seen for a genetic counseling appointment today to discuss genetic testing for the known familial variant. Today's visit was conducted via telephone     Suresh reports that over the past year she has had worsening numbness in her hands and arms and that this increases with colder weather. She reports that previously she thought she may have multiple sclerosis.     Upon chart review, it looks like Suresh was seen by a Neurologist (Dr. VANCE Johnson, resident, and Dr. Cutler attending) at [facility] in September 2023 for evaluation of her history of restless leg syndrome, 5 years of dysphagia, intermittent bilateral upper and lower extremity numbness, weakness, blurred vision, urinary frequency and cognitive changes. They did not feel her symptoms were consistent with multiple sclerosis. In the clinic note from this visit it is documented that there is a history of MS in Suresh's mother and maternal aunt, but in the patient entered questionnaire it is documented that there is a family history of CMT, but this was not commented in the note so I fear this was misattributed as MS by both the patient and neurologist.      Suresh reports no other major health concerns today. Her sister in-law was the one who informed her about the positive family genetic testing for CMT1X, so Suresh would like testing for herself to determine if she has  this familial condition.     Family History: A three generation pedigree was obtained at [facility]'s family member's genetic counseling visit and was updated and scanned into the electronic medical record. The relevant portions are described below:    Children-   7 year old daughter who is healthy  4 year old son who is healthy  Siblings-   Brother, Mohan, is 46 years old and has CMT with symptoms beginning when he was a toddler.   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   His 22 year old son is healthy  His 17 year old son is healthy.   Brother, Deborah, is 41 years old and has carpal tunnel.   He has one daughter and three sons who are healthy.   Brother, Paco, is 39 years old and is healthy.   He has two sons who are healthy.   Brother, Mehrdad, is 37 years old and has CMT symptoms.   His 3 year old daughter has poor coordination and balance and had a neuropathies panel which identified the GJB1 c.548G>A (p.[id number]) variant.   He has a 13 year old son who is healthy. He has three other children, but there is limited information about their health since there is no contact with them.   Brother, Ender, is 35 years old and has CMT symptoms.   He has one son who is healthy.   Parents-   Mother is reported to have CMT although her symptoms are milder than her sons'.  Father is alive and well.   Maternal Relatives-   At least 2 maternal aunts who are reported to have CMT. One aunt has a son who is severely affected with CMT.   No information about other maternal aunts/uncles.   Maternal grandmother is    Maternal grandfather is reported to have had CMT symptoms. He is .   Paternal Relatives- no paternal relative information collected today    Family history is otherwise largely non-contributory. Maternal and paternal ancestry is . Consanguinity was denied.     Genetic Counseling  Discussion:  For review, our bodies are made of cells that contain our chromosomes which are made up of long stretches of DNA containing our genes. Our genes serve as the instructions for our bodies to grow and function. We have two copies of each gene, one inherited from our mother and one inherited from our father.     Suresh family has CMT1X with a GJB1 pathogenic variant called c.548G>A (p.[id number]) identified in two of her nieces (her brothers daughters).      GJB1 gene and CMT1X:  The GJB1 gene provides the instructions for building a protein called connexin 32. This protein plays an important role in the peripheral nervous system, although this role is not fully understood. Pathogenic variants (mutations) in the GJB1 gene cause Charcot-Tete-Tooth disease type 1X (CMT1X).      Charcot-Tete-Tooth (CMT) disease encompasses a group of disorders called hereditary sensory and motor neuropathies. CMT causes damage to the peripheral nerves that connect the brain and spinal cord to the muscles and sensory cells. The damage to the peripheral nerves causes muscle weakness and atrophy and loss of sensation in the feet, legs, and hands. Muscle weakness is symmetric and typically begins in the first to third decade of life and slowly progresses. Individuals with CMT may also have foot anomalies such as high arches, flat feet, or curled toes (hammer toes) and in rare cases they may develop hearing or vision loss. In most affected individuals CMT does not affect life expectancy. There are several different types of CMT (CMT1, CMT2, CMT4, CMTX) that were originally classified by their effects on nerve cells and patterns of inheritance and have now been further classified as the genetic cause of CMT subtypes have been uncovered.     CMT1X is the second most common type of CMT, accounting for 10% of all genetically defined CMT. CMT1X is a peripheral neuropathy that causes damage to the myelin in two types of the nerves, the  "motor nerves (involved in muscle movement) and the sensory nerves (involved in sensation or feeling). Damage to motor nerves causes muscle weakness and difficulty with muscle movement, especially in the hands and feet. This can lead to difficulty walking, writing, putting on jewelry and many other types of movement. Weakness in the small muscles of the foot can also lead to changes in the structure of the foot such as hammer toes or high/low arches. Damage to the sensory nerves can cause numbness, tingling and impaired balance, which can lead to fatigue.     The GJB1 gene is located on the X chromosome and therefore this condition is inherited in an X-linked inheritance pattern. Because males have one X chromosome and one Y chromosome, they will be affected if their X chromosome gene has a pathogenic variant. Because females have two X chromosomes, if one of their genes has a variant, they have a \"back-up\" second copy of the gene so they may be more mildly affected or unaffected. Female carriers of X-linked conditions have a 50% chance of passing their pathogenic variant to each child regardless of the child's sex. Males who are affected with X-linked conditions will pass their pathogenic variant to each of their female children and none of their male children.     CMT1X is highly variable within members of the same family. Individuals may not experience any symptoms, these symptoms may be mild, or they might begin as mild and gradually become more severe. Boys and men with CMTX generally develop the symptoms of CMTX between the ages of 5 and 20 and most develop symptoms prior to age 10. However, about 20% of men with this form of CMT have a later onset of symptoms. Unlike other type of CMT, individuals may experience weakness in their intrinsic hand muscles, especially their thenar (thumb) muscles, as their first symptom. Muscle weakness may be especially prominent in their thumbs and distal calf " muscles.Additionally, there are many reports of stroke-like episodes that occur in men with CMTX prior to age 20. These episodes may involve inability to speak and/or muscle weakness on one side of the body which generally resolves without lasting damage but may reoccur. Almost all females with CMTX have atypical nerve conduction study results but some may never develop symptoms of this condition. Many females with CMTX have more mild symptoms that what is seen in males. In some studies, as many as 1/3 of females with CMTX have a more severe presentation and their symptoms progress similarly to males with this condition. Genetic testing cannot predict how mild or severe an individual's symptoms will be.    Genetic Testing:  We discussed the potential benefits of genetic testing for the known familial GJB1 variant for Amea today and why this genetic testing is medically indicated. A positive result will help determine the etiology of the numbness symptoms noted in Suresh and will guide the medical management for her. We discussed that if her results are positive for the familial variant, we can refer her to our specialist in our comprehensive CMT clinic (Neurologist, Physical Therapy, Genetic Counseling, and other specialists). Also, if Suresh does have the familial CMT1X, it will give us a more accurate risk assessment for her children who would then have a 50% chance of inheriting the condition.     This testing for Suresh will be performed via Next Generation Sequencing (NGS) and will only be analyzing the GJB1 gene. NGS is a well established technology utilized by all molecular genetic labs throughout the country for identifying disease-causing mutations in various genes.  NGS is currently the standard of care for genetic testing of single genes.  The recommended testing for Amea is DIAGNOSTIC testing, and it is NOT investigational.    Suresh consented to genetic testing today. She will be mailed a buccal kit for sample  collection. [facility] will bill Suresh's insurance directly and overall cost may depend on the remaining benefits, deductible, and co-insurances. Suresh will receive a bill from [facility] and if the bill is >$100 a representative from [facility] will reach out and discuss their financial assistance programs and payment plans. I will call Suresh with results of testing about 2-3 weeks after the lab receives her sample.     It was a pleasure talking with Suresh today. She was encouraged to reach out to me if she has any further questions.     Plan:  [facility] GJB1 Familial Variant Testing  Suresh will be mailed a buccal kit for sample collection. I will call her with results about 2-3 weeks after the lab receives her sample.       Jolene Ronquillo MS, [facility]  Licensed Genetic Counselor   [facility]  Phone: [phone]  Fax: [phone]    Time spent in consultation via telephone was approximately 10 minutes.      Please do not hesitate to contact me if you have any questions/concerns.     Sincerely,       Britt Ronquillo, GC

## 2024-10-10 ENCOUNTER — LAB REQUISITION (OUTPATIENT)
Dept: LAB | Facility: CLINIC | Age: 39
End: 2024-10-10

## 2024-10-10 DIAGNOSIS — R53.83 OTHER FATIGUE: ICD-10-CM

## 2024-10-10 DIAGNOSIS — Z13.1 ENCOUNTER FOR SCREENING FOR DIABETES MELLITUS: ICD-10-CM

## 2024-10-10 PROCEDURE — 82306 VITAMIN D 25 HYDROXY: CPT | Performed by: FAMILY MEDICINE

## 2024-10-10 PROCEDURE — 80061 LIPID PANEL: CPT | Performed by: FAMILY MEDICINE

## 2024-10-10 PROCEDURE — 84443 ASSAY THYROID STIM HORMONE: CPT | Performed by: FAMILY MEDICINE

## 2024-10-10 PROCEDURE — 82607 VITAMIN B-12: CPT | Performed by: FAMILY MEDICINE

## 2024-10-10 PROCEDURE — 80053 COMPREHEN METABOLIC PANEL: CPT | Performed by: FAMILY MEDICINE

## 2024-10-11 ENCOUNTER — LAB REQUISITION (OUTPATIENT)
Dept: LAB | Facility: CLINIC | Age: 39
End: 2024-10-11

## 2024-10-11 LAB
ALBUMIN SERPL BCG-MCNC: 4.5 G/DL (ref 3.5–5.2)
ALP SERPL-CCNC: 58 U/L (ref 40–150)
ALT SERPL W P-5'-P-CCNC: 15 U/L (ref 0–50)
ANION GAP SERPL CALCULATED.3IONS-SCNC: 9 MMOL/L (ref 7–15)
AST SERPL W P-5'-P-CCNC: 16 U/L (ref 0–45)
BILIRUB SERPL-MCNC: <0.2 MG/DL
BUN SERPL-MCNC: 15.1 MG/DL (ref 6–20)
CALCIUM SERPL-MCNC: 9.2 MG/DL (ref 8.8–10.4)
CHLORIDE SERPL-SCNC: 107 MMOL/L (ref 98–107)
CHOLEST SERPL-MCNC: 198 MG/DL
CREAT SERPL-MCNC: 0.72 MG/DL (ref 0.51–0.95)
EGFRCR SERPLBLD CKD-EPI 2021: >90 ML/MIN/1.73M2
FASTING STATUS PATIENT QL REPORTED: ABNORMAL
FASTING STATUS PATIENT QL REPORTED: ABNORMAL
GLUCOSE SERPL-MCNC: 130 MG/DL (ref 70–99)
HCO3 SERPL-SCNC: 22 MMOL/L (ref 22–29)
HDLC SERPL-MCNC: 63 MG/DL
LDLC SERPL CALC-MCNC: 100 MG/DL
NONHDLC SERPL-MCNC: 135 MG/DL
POTASSIUM SERPL-SCNC: 3.9 MMOL/L (ref 3.4–5.3)
PROT SERPL-MCNC: 6.7 G/DL (ref 6.4–8.3)
SODIUM SERPL-SCNC: 138 MMOL/L (ref 135–145)
TRIGL SERPL-MCNC: 175 MG/DL
TSH SERPL DL<=0.005 MIU/L-ACNC: 0.91 UIU/ML (ref 0.3–4.2)
VIT B12 SERPL-MCNC: 445 PG/ML (ref 232–1245)
VIT D+METAB SERPL-MCNC: 51 NG/ML (ref 20–50)

## 2024-10-11 PROCEDURE — 87624 HPV HI-RISK TYP POOLED RSLT: CPT | Performed by: FAMILY MEDICINE

## 2024-10-11 PROCEDURE — G0145 SCR C/V CYTO,THINLAYER,RESCR: HCPCS | Performed by: FAMILY MEDICINE

## 2024-10-14 LAB
HPV HR 12 DNA CVX QL NAA+PROBE: NEGATIVE
HPV16 DNA CVX QL NAA+PROBE: NEGATIVE
HPV18 DNA CVX QL NAA+PROBE: NEGATIVE
HUMAN PAPILLOMA VIRUS FINAL DIAGNOSIS: NORMAL

## 2024-10-16 LAB
BKR AP ASSOCIATED HPV REPORT: NORMAL
BKR LAB AP GYN ADEQUACY: NORMAL
BKR LAB AP GYN INTERPRETATION: NORMAL
BKR LAB AP LMP: NORMAL
BKR LAB AP PREVIOUS ABNL DX: NORMAL
BKR LAB AP PREVIOUS ABNORMAL: NORMAL
PATH REPORT.COMMENTS IMP SPEC: NORMAL
PATH REPORT.COMMENTS IMP SPEC: NORMAL
PATH REPORT.RELEVANT HX SPEC: NORMAL

## 2024-11-09 ENCOUNTER — HEALTH MAINTENANCE LETTER (OUTPATIENT)
Age: 39
End: 2024-11-09